# Patient Record
Sex: MALE | Race: WHITE | NOT HISPANIC OR LATINO | URBAN - METROPOLITAN AREA
[De-identification: names, ages, dates, MRNs, and addresses within clinical notes are randomized per-mention and may not be internally consistent; named-entity substitution may affect disease eponyms.]

---

## 2019-11-01 ENCOUNTER — EMERGENCY (EMERGENCY)
Facility: HOSPITAL | Age: 54
LOS: 1 days | Discharge: HOME | End: 2019-11-01
Attending: EMERGENCY MEDICINE | Admitting: EMERGENCY MEDICINE
Payer: OTHER MISCELLANEOUS

## 2019-11-01 VITALS
SYSTOLIC BLOOD PRESSURE: 181 MMHG | DIASTOLIC BLOOD PRESSURE: 101 MMHG | HEIGHT: 71 IN | OXYGEN SATURATION: 96 % | RESPIRATION RATE: 16 BRPM | WEIGHT: 179.9 LBS | TEMPERATURE: 97 F

## 2019-11-01 VITALS — HEART RATE: 55 BPM | OXYGEN SATURATION: 96 % | RESPIRATION RATE: 16 BRPM

## 2019-11-01 PROCEDURE — 70480 CT ORBIT/EAR/FOSSA W/O DYE: CPT | Mod: 26,59

## 2019-11-01 PROCEDURE — 99284 EMERGENCY DEPT VISIT MOD MDM: CPT

## 2019-11-01 PROCEDURE — 70450 CT HEAD/BRAIN W/O DYE: CPT | Mod: 26

## 2019-11-01 RX ORDER — ROSUVASTATIN CALCIUM 5 MG/1
0 TABLET ORAL
Qty: 0 | Refills: 0 | DISCHARGE

## 2019-11-01 RX ORDER — METFORMIN HYDROCHLORIDE 850 MG/1
0 TABLET ORAL
Qty: 0 | Refills: 0 | DISCHARGE

## 2019-11-01 NOTE — ED PROVIDER NOTE - CLINICAL SUMMARY MEDICAL DECISION MAKING FREE TEXT BOX
54 y o male presents to ED with right eye pain w/ associated blurred vision s/p traumatic injury with metal object. CT scan, pain medication and f/u with eye clinic s/p CT results. DC with opthalmic abx. 54 y o male presents to ED with right eye pain w/ associated blurred vision s/p traumatic injury with metal object. CT scan, pain medication and f/u with eye clinic s/p CT results.

## 2019-11-01 NOTE — ED PROVIDER NOTE - CARDIOVASCULAR NEGATIVE STATEMENT, MLM
Patricia Anne from [facility] and relayed DR. MAHMOOD message - verbalized understanding no chest pain and no edema.

## 2019-11-01 NOTE — ED ADULT TRIAGE NOTE - BMI (KG/M2)
SL PICC PLACEMENT REQUESTED. PATIENT HAS A LEFT MASTECTOMY AND A RIGHT CHEST
PACEMAKER.  PACEMAKER PLACED JANUARY 2, 2014.  SPOKE WITH PATIENT'S
CARDIOLOGIST, DR. SILVA, TO OK THE PLACEMENT OF PICC IN THE RIGHT ARM.
DR. SILVA GAVE OK TO PLACE PICC IN RIGHT ARM. 25.1

## 2019-11-01 NOTE — ED PROVIDER NOTE - EYES [+], MLM
right eye pain, right eyelid swelling/VISUAL CHANGES REDNESS/right eye pain, right eyelid swelling/VISUAL CHANGES

## 2019-11-01 NOTE — ED PROVIDER NOTE - PATIENT PORTAL LINK FT
You can access the FollowMyHealth Patient Portal offered by [facility] by registering at the following website: [email or web address]. By joining [facility]’s FollowMyHealth portal, you will also be able to view your health information using other applications (apps) compatible with our system.

## 2019-11-01 NOTE — ED PROVIDER NOTE - NSFOLLOWUPINSTRUCTIONS_ED_ALL_ED_FT
Go directly to Dr. Luna's ophthalmology office at the address listed here.  They are expecting you.  Please return immediately if you have any problems or concerns at all.

## 2019-11-01 NOTE — ED PROVIDER NOTE - EYE, RIGHT
TENDERNESS/SWELLING CONJUNCTIVA ERYTHEMA/pupils equal, round, and reactive to light/CHEMOSIS/TENDERNESS/SWELLING

## 2019-11-01 NOTE — ED PROVIDER NOTE - CARE PROVIDER_API CALL
Butch Luna)  Ophthalmology  [address] [address]  Phone: [phone]  Fax: [phone]  Follow Up Time:

## 2019-11-01 NOTE — ED PROVIDER NOTE - CARE PLAN
Principal Discharge DX:	Visual changes  Secondary Diagnosis:	Eye trauma, superficial, right, initial encounter

## 2019-11-01 NOTE — ED ADULT TRIAGE NOTE - CHIEF COMPLAINT QUOTE
pt was hit in face by metal tool in right eye. c/o changes in vision. blood to eye and swelling to area. sentt from urgent care

## 2019-11-01 NOTE — ED PROVIDER NOTE - OBJECTIVE STATEMENT
54 y o male with PMHx of DM II presents to ED with c/o right eye pain s/p injury with metal object this morning. 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 Pt endorsing blurred vision and "feeling of white sheet over eye with blood". Denies LOC, N/V, discharge, fevers, or chills. 54 y o male with PMHx of DM II presents to ED with c/o right eye pain s/p injury with metal object this morning at 9:20 AM. 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 Pt endorsing blurred vision and "feeling of white sheet over eye with blood". Denies LOC, N/V, discharge, fevers, or chills.  Reports decreased vision in right eye due to blurriness and feeling that there is blood or "something like that" inside his eye.  Was originally sent to [facility] then here.  No other injuries.  Left eye normal. No LOC.  No headache, dizziness, nausea, vomiting, or other complaints.  Patient does not wear glasses or contacts.

## 2019-11-01 NOTE — ED ADULT TRIAGE NOTE - AS HEIGHT TYPE
Progress Notes by Natty Castle CMA at 05/19/17 03:15 PM     Author:  Natty Castle CMA Service:  (none) Author Type:  Certified Medical Assistant     Filed:  05/19/17 03:16 PM Encounter Date:  5/19/2017 Status:  Signed     :  Natty Castle CMA (Certified Medical Assistant)              We have recommended to patient/parent/guardian that they should wait 15 minutes after receiving an injection. Patient[MP1.1T]'s mother[MP1.1M] received Vaccine Information Sheet(s) for vaccine(s) administered.      Electronically Signed by:    Natty Castle CMA , 5/19/2017[MP1.1T]      Revision History        User Key Date/Time User Provider Type Action    > MP1.1 05/19/17 03:16 PM Natty Castle CMA Certified Medical Assistant Sign    M - Manual, T - Template            
stated

## 2019-11-01 NOTE — ED ADULT NURSE NOTE - OBJECTIVE STATEMENT
struck in right eye with metal cable apx 2hrs ago, c/o pain, swelling, and blurred vision to right eye, unable to open eyelid (d/t pain) for assessment, clinical upgraded called, awaiting provider more

## 2019-11-01 NOTE — ED PROVIDER NOTE - PROGRESS NOTE DETAILS
Radiology called - no FB, no obvious globe rupture, just soft tissue swelling.  Given visual changes patient will require urgent ophthalmology evaluation.  Dr. Luna's office was called.  They will have one of their docs call me back shortly to arrange urgent evaluation. Discussed again with Dr. Luna's office.  Ok to send patient directly over for evaluation.

## 2019-11-08 DIAGNOSIS — Z79.84 LONG TERM (CURRENT) USE OF ORAL HYPOGLYCEMIC DRUGS: ICD-10-CM
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
## 2019-11-08 DIAGNOSIS — F17.200 NICOTINE DEPENDENCE, UNSPECIFIED, UNCOMPLICATED: ICD-10-CM

## 2019-11-08 DIAGNOSIS — E11.9 TYPE 2 DIABETES MELLITUS WITHOUT COMPLICATIONS: ICD-10-CM

## 2019-11-08 DIAGNOSIS — S05.91XA UNSPECIFIED INJURY OF RIGHT EYE AND ORBIT, INITIAL ENCOUNTER: ICD-10-CM

## 2019-11-08 DIAGNOSIS — H57.11 OCULAR PAIN, RIGHT EYE: ICD-10-CM

## 2024-01-22 NOTE — ED PROVIDER NOTE - DISPOSITION TYPE
If an incision was performed as part of your procedure, contact your doctor with any pain, swelling, redness, or other concerns you may have about that area.
DISCHARGE